# Patient Record
Sex: FEMALE | Race: WHITE | Employment: PART TIME | URBAN - METROPOLITAN AREA
[De-identification: names, ages, dates, MRNs, and addresses within clinical notes are randomized per-mention and may not be internally consistent; named-entity substitution may affect disease eponyms.]

---

## 2019-11-30 ENCOUNTER — APPOINTMENT (OUTPATIENT)
Dept: CT IMAGING | Age: 64
End: 2019-11-30
Attending: PHYSICIAN ASSISTANT
Payer: COMMERCIAL

## 2019-11-30 ENCOUNTER — HOSPITAL ENCOUNTER (EMERGENCY)
Age: 64
Discharge: HOME | End: 2019-11-30
Attending: EMERGENCY MEDICINE
Payer: COMMERCIAL

## 2019-11-30 VITALS
OXYGEN SATURATION: 100 % | TEMPERATURE: 98.2 F | RESPIRATION RATE: 17 BRPM | DIASTOLIC BLOOD PRESSURE: 80 MMHG | SYSTOLIC BLOOD PRESSURE: 172 MMHG | WEIGHT: 136.91 LBS | HEART RATE: 85 BPM

## 2019-11-30 DIAGNOSIS — S09.90XA INJURY OF HEAD, INITIAL ENCOUNTER: ICD-10-CM
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
## 2019-11-30 PROCEDURE — 70450 CT HEAD/BRAIN W/O DYE: CPT

## 2019-11-30 PROCEDURE — 74011250637 HC RX REV CODE- 250/637: Performed by: PHYSICIAN ASSISTANT

## 2019-11-30 PROCEDURE — 99282 EMERGENCY DEPT VISIT SF MDM: CPT

## 2019-11-30 RX ORDER — ACETAMINOPHEN 325 MG/1
650 TABLET ORAL
Status: COMPLETED | OUTPATIENT
Start: 2019-11-30 | End: 2019-11-30

## 2019-11-30 RX ADMIN — ACETAMINOPHEN 650 MG: 325 TABLET ORAL at 18:57

## 2019-11-30 NOTE — ED PROVIDER NOTES
Was walking down 4-5 concrete stairs slipped and fell, tried to catch self with hand, leg buckled under and knee hit ground and head against metal bar, butt hit ground or bottom step. Was able to get up with assistance. Has brusing to the back of the head, size of half of an advocado. Kept ice on the area. Contacted insurance and recommended evaluation because of head injury. Was seen at urgent care had an \"EEG\" and was referred to the ED. Mild frontal headache reported. Some generalized soreness. -neck pain, dizziness, extremity numbness, extremity weakness, vision changes, difficulty ambulating, chest pain, SOB           Past Medical History:   Diagnosis Date    Diabetes ([facility] [address]     Rheumatoid arthritis ([facility] [address]        History reviewed. No pertinent surgical history. History reviewed. No pertinent family history. Social History     Socioeconomic History    Marital status:      Spouse name: Not on file    Number of children: Not on file    Years of education: Not on file    Highest education level: Not on file   Occupational History    Not on file   Social Needs    Financial resource strain: Not on file    Food insecurity:     Worry: Not on file     Inability: Not on file    Transportation needs:     Medical: Not on file     Non-medical: Not on file   Tobacco Use    Smoking status: Never Smoker    Smokeless tobacco: Never Used   Substance and Sexual Activity    Alcohol use:  Yes    Drug use: Never    Sexual activity: Not on file   Lifestyle    Physical activity:     Days per week: Not on file     Minutes per session: Not on file    Stress: Not on file   Relationships    Social connections:     Talks on phone: Not on file     Gets together: Not on file     Attends [employer] service: Not on file     Active member of club or organization: Not on file     Attends meetings of clubs or organizations: Not on file     Relationship status: Not on file    Intimate partner violence:     Fear of current or ex partner: Not on file     Emotionally abused: Not on file     Physically abused: Not on file     Forced sexual activity: Not on file   Other Topics Concern    Not on file   Social History Narrative    Not on file         ALLERGIES: Lisinopril    Review of Systems   Constitutional: Negative. Negative for chills, fatigue and fever. HENT: Negative. Negative for congestion, ear pain, facial swelling, rhinorrhea, sneezing and sore throat. Respiratory: Negative for cough and shortness of breath. Cardiovascular: Negative. Negative for chest pain. Gastrointestinal: Negative. Negative for abdominal pain, constipation, diarrhea, nausea and vomiting. Genitourinary: Negative for difficulty urinating, frequency and urgency. Musculoskeletal: Positive for myalgias (soreness). Neurological: Positive for headaches. Negative for dizziness and numbness. All other systems reviewed and are negative. Vitals:    11/30/19 1733   BP: 189/77   Pulse: 88   Resp: 18   Temp: 98.3 °F (36.8 °C)   SpO2: 100%   Weight: 62.1 kg (136 lb 14.5 oz)            Physical Exam  Vitals signs and nursing note reviewed. Constitutional:       General: She is not in acute distress. Appearance: She is well-developed. HENT:      Head: Normocephalic. Right Ear: Tympanic membrane, ear canal and external ear normal.      Left Ear: Tympanic membrane, ear canal and external ear normal.      Nose: Nose normal.      Mouth/Throat:      Mouth: Mucous membranes are moist.      Pharynx: Oropharynx is clear. No oropharyngeal exudate or posterior oropharyngeal erythema. Eyes:      Conjunctiva/sclera: Conjunctivae normal.      Pupils: Pupils are equal, round, and reactive to light. Neck:      Musculoskeletal: Normal range of motion and neck supple. No spinous process tenderness or muscular tenderness. Cardiovascular:      Rate and Rhythm: Normal rate and regular rhythm. Heart sounds: Normal heart sounds. Pulmonary:      Effort: Pulmonary effort is normal. No respiratory distress. Breath sounds: No wheezing. Abdominal:      General: Bowel sounds are normal. There is no distension. Palpations: Abdomen is soft. Tenderness: There is no tenderness. There is no rebound. Musculoskeletal: Normal range of motion. Skin:     General: Skin is warm and dry. Findings: No ecchymosis, laceration or lesion. Neurological:      Mental Status: She is alert and oriented to person, place, and time. Coordination: Coordination normal.      Gait: Gait normal.   Psychiatric:         Mood and Affect: Mood normal.          MDM  Number of Diagnoses or Management Options  Fall, initial encounter:   Injury of head, initial encounter:   Diagnosis management comments: 27-year-old  female presenting to the ED with complaint of head injury. She appears well with small abrasion noticed to the scalp on exam.  Will check CT head and provide analgesia. Amount and/or Complexity of Data Reviewed  Tests in the radiology section of CPT®: ordered and reviewed  Independent visualization of images, tracings, or specimens: yes           Procedures      Progress note  Imaging reviewed. No acute abnormality noted. [facility] [place]      Patient's results have been reviewed with them. Patient and/or family have verbally conveyed their understanding and agreement of the patient's signs, symptoms, diagnosis, treatment and prognosis and additionally agree to follow up as recommended or return to the Emergency Room should their condition change prior to follow-up. Discharge instructions have also been provided to the patient with some educational information regarding their diagnosis as well a list of reasons why they would want to return to the ER prior to their follow-up appointment should their condition change.  Jocelyn [facility] [place]

## 2019-11-30 NOTE — ED TRIAGE NOTES
TRIAGE NOTE:  Patient arrives ambulatory accompanied by daughter from [facility] for CT. Patient reports she was walking down flight of 6 concrete steps and foot slipped and patient fell and hit on the metal railing.   Denies LOC, visual disturbances, N/V.

## 2019-12-01 NOTE — DISCHARGE INSTRUCTIONS
Patient Education        Learning About a Closed Head Injury  What is a closed head injury? A closed head injury happens when your head gets hit hard. The strong force of the blow causes your brain to shake in your skull. This movement can cause the brain to bruise, swell, or tear. Sometimes nerves or blood vessels also get damaged. This can cause bleeding in or around the brain. A concussion is a type of closed head injury. What are the symptoms? If you have a mild concussion, you may have a mild headache or feel \"not quite right. \" These symptoms are common. They usually go away over a few days to 4 weeks. But sometimes after a concussion, you feel like you can't function as well as before the injury. And you have new symptoms. This is called postconcussive syndrome. You may:  · Find it harder to solve problems, think, concentrate, or remember. · Have headaches. · Have changes in your sleep patterns, such as not being able to sleep or sleeping all the time. · Have changes in your personality. · Not be interested in your usual activities. · Feel angry or anxious without a clear reason. · Lose your sense of taste or smell. · Be dizzy, lightheaded, or unsteady. It may be hard to stand or walk. How is a closed head injury treated? Any person who may have a concussion needs to see a doctor. Some people have to stay in the hospital to be watched. Others can go home safely. If you go home, follow your doctor's instructions. He or she will tell you if you need someone to watch you closely for the next 24 hours or longer. Rest is the best treatment. Get plenty of sleep at night. And try to rest during the day. · Avoid activities that are physically or mentally demanding. These include housework, exercise, and schoolwork. And don't play video games, send text messages, or use the computer. You may need to change your school or work schedule to be able to avoid these activities.   · Ask your doctor when it's okay to drive, ride a bike, or operate machinery. · Take an over-the-counter pain medicine, such as acetaminophen (Tylenol), ibuprofen (Advil, Motrin), or naproxen (Aleve). Be safe with medicines. Read and follow all instructions on the label. · Check with your doctor before you use any other medicines for pain. · Do not drink alcohol or use illegal drugs. They can slow recovery. They can also increase your risk of getting a second head injury. Follow-up care is a key part of your treatment and safety. Be sure to make and go to all appointments, and call your doctor if you are having problems. It's also a good idea to know your test results and keep a list of the medicines you take. Where can you learn more? Go to http://bernardo-zeny.info/. Enter E235 in the search box to learn more about \"Learning About a Closed Head Injury. \"  Current as of: March 28, 2019  Content Version: 12.2  © 3486-2452 [employer]. Care instructions adapted under license by [employer] (which disclaims liability or warranty for this information). If you have questions about a medical condition or this instruction, always ask your healthcare professional. [address] any warranty or liability for your use of this information.

## 2019-12-01 NOTE — ED NOTES
1954 PA reviewed discharge instructions with the patient. The patient verbalized understanding. Patient ambulated out of ED with another lady. No complaints or concerns noted.

## 2024-10-28 ENCOUNTER — NEW PATIENT COMPREHENSIVE (OUTPATIENT)
Dept: URBAN - METROPOLITAN AREA CLINIC 42 | Facility: CLINIC | Age: 69
End: 2024-10-28

## 2024-10-28 ASSESSMENT — VISUAL ACUITY
OD_CC: 20/40-2
OU_CC: J1
OS_CC: 20/30+2

## 2024-10-28 ASSESSMENT — TONOMETRY
OS_IOP_MMHG: 17
OD_IOP_MMHG: 17

## 2025-02-20 ENCOUNTER — ASCAN/IOL MASTER (OUTPATIENT)
Dept: URBAN - METROPOLITAN AREA CLINIC 42 | Facility: CLINIC | Age: 70
End: 2025-02-20

## 2025-02-20 DIAGNOSIS — H43.393: ICD-10-CM

## 2025-02-20 DIAGNOSIS — H25.813: ICD-10-CM

## 2025-02-20 DIAGNOSIS — E10.3293: ICD-10-CM

## 2025-02-20 PROCEDURE — 99214 OFFICE O/P EST MOD 30 MIN: CPT

## 2025-02-20 PROCEDURE — 92136 OPHTHALMIC BIOMETRY: CPT

## 2025-02-20 ASSESSMENT — TONOMETRY
OS_IOP_MMHG: 17
OD_IOP_MMHG: 17

## 2025-02-20 ASSESSMENT — VISUAL ACUITY
OS_SC: 20/30
OD_SC: 20/30

## 2025-03-27 ENCOUNTER — POST-OP CHECK (OUTPATIENT)
Dept: URBAN - METROPOLITAN AREA CLINIC 42 | Facility: CLINIC | Age: 70
End: 2025-03-27

## 2025-03-27 DIAGNOSIS — Z96.1: ICD-10-CM

## 2025-03-27 PROCEDURE — 99024 POSTOP FOLLOW-UP VISIT: CPT

## 2025-03-27 ASSESSMENT — VISUAL ACUITY: OS_SC: 20/25

## 2025-03-27 ASSESSMENT — TONOMETRY: OS_IOP_MMHG: 15

## 2025-03-28 ENCOUNTER — POST-OP CHECK (OUTPATIENT)
Dept: URBAN - METROPOLITAN AREA CLINIC 42 | Facility: CLINIC | Age: 70
End: 2025-03-28

## 2025-03-28 DIAGNOSIS — Z96.1: ICD-10-CM

## 2025-03-28 PROCEDURE — 99024 POSTOP FOLLOW-UP VISIT: CPT

## 2025-03-28 ASSESSMENT — VISUAL ACUITY: OS_SC: 20/40

## 2025-03-28 ASSESSMENT — TONOMETRY: OS_IOP_MMHG: 18

## 2025-04-02 ENCOUNTER — 1 DAY POST-OP (OUTPATIENT)
Dept: URBAN - METROPOLITAN AREA CLINIC 42 | Facility: CLINIC | Age: 70
End: 2025-04-02

## 2025-04-02 DIAGNOSIS — Z96.1: ICD-10-CM

## 2025-04-02 PROCEDURE — 99024 POSTOP FOLLOW-UP VISIT: CPT

## 2025-04-02 ASSESSMENT — TONOMETRY: OD_IOP_MMHG: 16

## 2025-04-02 ASSESSMENT — VISUAL ACUITY: OD_SC: 20/20-2

## 2025-05-02 ENCOUNTER — 1 MONTH POST-OP (OUTPATIENT)
Dept: URBAN - METROPOLITAN AREA CLINIC 42 | Facility: CLINIC | Age: 70
End: 2025-05-02

## 2025-05-02 DIAGNOSIS — Z96.1: ICD-10-CM

## 2025-05-02 PROCEDURE — 99024 POSTOP FOLLOW-UP VISIT: CPT

## 2025-05-02 ASSESSMENT — TONOMETRY
OS_IOP_MMHG: 16
OD_IOP_MMHG: 15

## 2025-05-02 ASSESSMENT — VISUAL ACUITY
OD_SC: 20/20-2
OS_PH: 20/20-2
OS_SC: 20/30-2